# Patient Record
Sex: FEMALE | Race: WHITE | URBAN - METROPOLITAN AREA
[De-identification: names, ages, dates, MRNs, and addresses within clinical notes are randomized per-mention and may not be internally consistent; named-entity substitution may affect disease eponyms.]

---

## 2022-04-04 ENCOUNTER — OFFICE VISIT (OUTPATIENT)
Dept: URBAN - METROPOLITAN AREA CLINIC 22 | Facility: CLINIC | Age: 72
End: 2022-04-04
Payer: COMMERCIAL

## 2022-04-04 DIAGNOSIS — H52.13 MYOPIA, BILATERAL: Primary | ICD-10-CM

## 2022-04-04 PROCEDURE — 92004 COMPRE OPH EXAM NEW PT 1/>: CPT | Performed by: STUDENT IN AN ORGANIZED HEALTH CARE EDUCATION/TRAINING PROGRAM

## 2022-04-04 ASSESSMENT — INTRAOCULAR PRESSURE
OS: 15
OD: 16

## 2022-04-04 ASSESSMENT — VISUAL ACUITY
OD: 20/25
OS: 20/25

## 2022-05-03 ENCOUNTER — OFFICE VISIT (OUTPATIENT)
Dept: URBAN - METROPOLITAN AREA CLINIC 22 | Facility: CLINIC | Age: 72
End: 2022-05-03
Payer: MEDICARE

## 2022-05-03 DIAGNOSIS — H25.813 COMBINED FORMS OF AGE-RELATED CATARACT, BILATERAL: ICD-10-CM

## 2022-05-03 DIAGNOSIS — H43.813 VITREOUS DEGENERATION, BILATERAL: ICD-10-CM

## 2022-05-03 DIAGNOSIS — E11.9 TYPE 2 DIABETES MELLITUS W/O COMPLICATION: Primary | ICD-10-CM

## 2022-05-03 PROCEDURE — 92014 COMPRE OPH EXAM EST PT 1/>: CPT | Performed by: STUDENT IN AN ORGANIZED HEALTH CARE EDUCATION/TRAINING PROGRAM

## 2022-05-03 ASSESSMENT — INTRAOCULAR PRESSURE
OS: 16
OD: 17

## 2022-05-03 NOTE — IMPRESSION/PLAN
Impression: Type 2 diabetes mellitus w/o complication: V96.6. Plan: Discussed findings, no retinopathy or macular edema OU. Patient educated on importance of well-controlled blood sugar/pressure, risk of vision loss from diabetic retinopathy, and dilated eye exams. Continue management with PCP.

## 2022-06-23 ENCOUNTER — OFFICE VISIT (OUTPATIENT)
Dept: URBAN - METROPOLITAN AREA CLINIC 22 | Facility: CLINIC | Age: 72
End: 2022-06-23
Payer: MEDICARE

## 2022-06-23 DIAGNOSIS — H10.45 OTHER CHRONIC ALLERGIC CONJUNCTIVITIS: Primary | ICD-10-CM

## 2022-06-23 DIAGNOSIS — H04.123 DRY EYE SYNDROME OF BILATERAL LACRIMAL GLANDS: ICD-10-CM

## 2022-06-23 PROCEDURE — 99213 OFFICE O/P EST LOW 20 MIN: CPT | Performed by: STUDENT IN AN ORGANIZED HEALTH CARE EDUCATION/TRAINING PROGRAM

## 2022-06-23 ASSESSMENT — INTRAOCULAR PRESSURE
OS: 13
OD: 13

## 2022-06-23 NOTE — IMPRESSION/PLAN
Impression: Dry eye syndrome of bilateral lacrimal glands: H04.123. Plan: See #1. Rx artificial tears TID-QID OU.

## 2022-06-23 NOTE — IMPRESSION/PLAN
Impression: Other chronic allergic conjunctivitis: H10.45. Plan: Discussed findings. No infection today. Rx Lotemax SM TID OU x 1-2 wks (sample given). OK to continue Pataday QD after. Continue Zyrtec PRN. Contact clinic if symptoms persist/worsen.

## 2023-09-19 ENCOUNTER — OFFICE VISIT (OUTPATIENT)
Dept: URBAN - METROPOLITAN AREA CLINIC 22 | Facility: CLINIC | Age: 73
End: 2023-09-19
Payer: COMMERCIAL

## 2023-09-19 DIAGNOSIS — H52.13 MYOPIA, BILATERAL: Primary | ICD-10-CM

## 2023-09-19 PROCEDURE — 92014 COMPRE OPH EXAM EST PT 1/>: CPT | Performed by: STUDENT IN AN ORGANIZED HEALTH CARE EDUCATION/TRAINING PROGRAM

## 2023-09-19 ASSESSMENT — INTRAOCULAR PRESSURE
OD: 12
OS: 11

## 2023-09-19 ASSESSMENT — VISUAL ACUITY
OS: 20/20
OD: 20/20

## 2023-09-25 ENCOUNTER — OFFICE VISIT (OUTPATIENT)
Dept: URBAN - METROPOLITAN AREA CLINIC 22 | Facility: CLINIC | Age: 73
End: 2023-09-25
Payer: MEDICARE

## 2023-09-25 DIAGNOSIS — H25.813 COMBINED FORMS OF AGE-RELATED CATARACT, BILATERAL: ICD-10-CM

## 2023-09-25 DIAGNOSIS — E11.9 TYPE 2 DIABETES MELLITUS W/O COMPLICATION: Primary | ICD-10-CM

## 2023-09-25 DIAGNOSIS — H04.123 DRY EYE SYNDROME OF BILATERAL LACRIMAL GLANDS: ICD-10-CM

## 2023-09-25 DIAGNOSIS — Z79.84 LONG TERM CURRENT USE OF ORAL HYPOGLYCEMIC DRUG: ICD-10-CM

## 2023-09-25 DIAGNOSIS — H43.813 VITREOUS DEGENERATION, BILATERAL: ICD-10-CM

## 2023-09-25 PROCEDURE — 92014 COMPRE OPH EXAM EST PT 1/>: CPT | Performed by: STUDENT IN AN ORGANIZED HEALTH CARE EDUCATION/TRAINING PROGRAM

## 2023-09-25 PROCEDURE — 92134 CPTRZ OPH DX IMG PST SGM RTA: CPT | Performed by: STUDENT IN AN ORGANIZED HEALTH CARE EDUCATION/TRAINING PROGRAM

## 2023-09-25 ASSESSMENT — INTRAOCULAR PRESSURE
OD: 14
OS: 14

## 2023-10-23 ENCOUNTER — OFFICE VISIT (OUTPATIENT)
Dept: URBAN - METROPOLITAN AREA CLINIC 22 | Facility: CLINIC | Age: 73
End: 2023-10-23
Payer: MEDICARE

## 2023-10-23 DIAGNOSIS — H04.123 DRY EYE SYNDROME OF BILATERAL LACRIMAL GLANDS: Primary | ICD-10-CM

## 2023-10-23 PROCEDURE — 99214 OFFICE O/P EST MOD 30 MIN: CPT | Performed by: STUDENT IN AN ORGANIZED HEALTH CARE EDUCATION/TRAINING PROGRAM

## 2023-10-23 RX ORDER — LIFITEGRAST 50 MG/ML
5 % SOLUTION/ DROPS OPHTHALMIC
Qty: 180 | Refills: 3 | Status: ACTIVE
Start: 2023-10-23

## 2023-10-23 ASSESSMENT — INTRAOCULAR PRESSURE
OS: 13
OD: 14